# Patient Record
Sex: FEMALE | HISPANIC OR LATINO | URBAN - METROPOLITAN AREA
[De-identification: names, ages, dates, MRNs, and addresses within clinical notes are randomized per-mention and may not be internally consistent; named-entity substitution may affect disease eponyms.]

---

## 2021-12-10 ENCOUNTER — EMERGENCY (EMERGENCY)
Facility: HOSPITAL | Age: 21
LOS: 1 days | Discharge: HOME | End: 2021-12-10
Attending: EMERGENCY MEDICINE | Admitting: EMERGENCY MEDICINE
Payer: MEDICAID

## 2021-12-10 VITALS
DIASTOLIC BLOOD PRESSURE: 74 MMHG | HEART RATE: 124 BPM | RESPIRATION RATE: 22 BRPM | OXYGEN SATURATION: 99 % | SYSTOLIC BLOOD PRESSURE: 135 MMHG

## 2021-12-10 DIAGNOSIS — F10.10 ALCOHOL ABUSE, UNCOMPLICATED: ICD-10-CM

## 2021-12-10 DIAGNOSIS — F43.20 ADJUSTMENT DISORDER, UNSPECIFIED: ICD-10-CM

## 2021-12-10 LAB
ALBUMIN SERPL ELPH-MCNC: 4.5 G/DL — SIGNIFICANT CHANGE UP (ref 3.3–5)
ALP SERPL-CCNC: 175 U/L — HIGH (ref 40–120)
ALT FLD-CCNC: 39 U/L — SIGNIFICANT CHANGE UP (ref 10–45)
AMPHET UR-MCNC: NEGATIVE — SIGNIFICANT CHANGE UP
ANION GAP SERPL CALC-SCNC: 12 MMOL/L — SIGNIFICANT CHANGE UP (ref 5–17)
APAP SERPL-MCNC: <1 UG/ML — LOW (ref 10–30)
AST SERPL-CCNC: 23 U/L — SIGNIFICANT CHANGE UP (ref 10–40)
BARBITURATES UR SCN-MCNC: NEGATIVE — SIGNIFICANT CHANGE UP
BASOPHILS # BLD AUTO: 0.12 K/UL — SIGNIFICANT CHANGE UP (ref 0–0.2)
BASOPHILS NFR BLD AUTO: 1.1 % — SIGNIFICANT CHANGE UP (ref 0–2)
BENZODIAZ UR-MCNC: NEGATIVE — SIGNIFICANT CHANGE UP
BILIRUB SERPL-MCNC: 0.3 MG/DL — SIGNIFICANT CHANGE UP (ref 0.2–1.2)
BUN SERPL-MCNC: 10 MG/DL — SIGNIFICANT CHANGE UP (ref 7–23)
CALCIUM SERPL-MCNC: 9.2 MG/DL — SIGNIFICANT CHANGE UP (ref 8.4–10.5)
CHLORIDE SERPL-SCNC: 104 MMOL/L — SIGNIFICANT CHANGE UP (ref 96–108)
CO2 SERPL-SCNC: 26 MMOL/L — SIGNIFICANT CHANGE UP (ref 22–31)
COCAINE METAB.OTHER UR-MCNC: NEGATIVE — SIGNIFICANT CHANGE UP
CREAT SERPL-MCNC: 0.72 MG/DL — SIGNIFICANT CHANGE UP (ref 0.5–1.3)
EOSINOPHIL # BLD AUTO: 0.18 K/UL — SIGNIFICANT CHANGE UP (ref 0–0.5)
EOSINOPHIL NFR BLD AUTO: 1.7 % — SIGNIFICANT CHANGE UP (ref 0–6)
ETHANOL SERPL-MCNC: 209 MG/DL — HIGH (ref 0–3)
ETHANOL SERPL-MCNC: 23 MG/DL — HIGH (ref 0–3)
GLUCOSE SERPL-MCNC: 129 MG/DL — HIGH (ref 70–99)
HAV IGM SER-ACNC: SIGNIFICANT CHANGE UP
HBV CORE IGM SER-ACNC: SIGNIFICANT CHANGE UP
HBV SURFACE AG SER-ACNC: SIGNIFICANT CHANGE UP
HCG SERPL-ACNC: <1 MIU/ML — SIGNIFICANT CHANGE UP
HCT VFR BLD CALC: 50.1 % — HIGH (ref 34.5–45)
HCV AB S/CO SERPL IA: 0.08 S/CO — SIGNIFICANT CHANGE UP (ref 0–0.99)
HCV AB SERPL-IMP: SIGNIFICANT CHANGE UP
HGB BLD-MCNC: 16.7 G/DL — HIGH (ref 11.5–15.5)
HIV 1 & 2 AB SERPL IA.RAPID: SIGNIFICANT CHANGE UP
IMM GRANULOCYTES NFR BLD AUTO: 1.2 % — SIGNIFICANT CHANGE UP (ref 0–1.5)
LYMPHOCYTES # BLD AUTO: 4.47 K/UL — HIGH (ref 1–3.3)
LYMPHOCYTES # BLD AUTO: 42.8 % — SIGNIFICANT CHANGE UP (ref 13–44)
MCHC RBC-ENTMCNC: 31.5 PG — SIGNIFICANT CHANGE UP (ref 27–34)
MCHC RBC-ENTMCNC: 33.3 GM/DL — SIGNIFICANT CHANGE UP (ref 32–36)
MCV RBC AUTO: 94.5 FL — SIGNIFICANT CHANGE UP (ref 80–100)
METHADONE UR-MCNC: NEGATIVE — SIGNIFICANT CHANGE UP
MONOCYTES # BLD AUTO: 0.95 K/UL — HIGH (ref 0–0.9)
MONOCYTES NFR BLD AUTO: 9.1 % — SIGNIFICANT CHANGE UP (ref 2–14)
NEUTROPHILS # BLD AUTO: 4.6 K/UL — SIGNIFICANT CHANGE UP (ref 1.8–7.4)
NEUTROPHILS NFR BLD AUTO: 44.1 % — SIGNIFICANT CHANGE UP (ref 43–77)
NRBC # BLD: 0 /100 WBCS — SIGNIFICANT CHANGE UP (ref 0–0)
OPIATES UR-MCNC: NEGATIVE — SIGNIFICANT CHANGE UP
PCP SPEC-MCNC: SIGNIFICANT CHANGE UP
PCP UR-MCNC: NEGATIVE — SIGNIFICANT CHANGE UP
PLATELET # BLD AUTO: 463 K/UL — HIGH (ref 150–400)
POTASSIUM SERPL-MCNC: 3.6 MMOL/L — SIGNIFICANT CHANGE UP (ref 3.5–5.3)
POTASSIUM SERPL-SCNC: 3.6 MMOL/L — SIGNIFICANT CHANGE UP (ref 3.5–5.3)
PROT SERPL-MCNC: 8.9 G/DL — HIGH (ref 6–8.3)
RBC # BLD: 5.3 M/UL — HIGH (ref 3.8–5.2)
RBC # FLD: 11.9 % — SIGNIFICANT CHANGE UP (ref 10.3–14.5)
SALICYLATES SERPL-MCNC: 0.9 MG/DL — LOW (ref 3–30)
SARS-COV-2 RNA SPEC QL NAA+PROBE: SIGNIFICANT CHANGE UP
SODIUM SERPL-SCNC: 142 MMOL/L — SIGNIFICANT CHANGE UP (ref 135–145)
T PALLIDUM AB TITR SER: NEGATIVE — SIGNIFICANT CHANGE UP
THC UR QL: NEGATIVE — SIGNIFICANT CHANGE UP
WBC # BLD: 10.45 K/UL — SIGNIFICANT CHANGE UP (ref 3.8–10.5)
WBC # FLD AUTO: 10.45 K/UL — SIGNIFICANT CHANGE UP (ref 3.8–10.5)

## 2021-12-10 PROCEDURE — 12002 RPR S/N/AX/GEN/TRNK2.6-7.5CM: CPT

## 2021-12-10 PROCEDURE — 87635 SARS-COV-2 COVID-19 AMP PRB: CPT

## 2021-12-10 PROCEDURE — 90471 IMMUNIZATION ADMIN: CPT

## 2021-12-10 PROCEDURE — 36415 COLL VENOUS BLD VENIPUNCTURE: CPT

## 2021-12-10 PROCEDURE — 80053 COMPREHEN METABOLIC PANEL: CPT

## 2021-12-10 PROCEDURE — 84702 CHORIONIC GONADOTROPIN TEST: CPT

## 2021-12-10 PROCEDURE — 90792 PSYCH DIAG EVAL W/MED SRVCS: CPT | Mod: 95

## 2021-12-10 PROCEDURE — 86703 HIV-1/HIV-2 1 RESULT ANTBDY: CPT

## 2021-12-10 PROCEDURE — 80307 DRUG TEST PRSMV CHEM ANLYZR: CPT

## 2021-12-10 PROCEDURE — 99285 EMERGENCY DEPT VISIT HI MDM: CPT | Mod: 25

## 2021-12-10 PROCEDURE — 93005 ELECTROCARDIOGRAM TRACING: CPT

## 2021-12-10 PROCEDURE — 93010 ELECTROCARDIOGRAM REPORT: CPT

## 2021-12-10 PROCEDURE — 80074 ACUTE HEPATITIS PANEL: CPT

## 2021-12-10 PROCEDURE — 90715 TDAP VACCINE 7 YRS/> IM: CPT

## 2021-12-10 PROCEDURE — 86780 TREPONEMA PALLIDUM: CPT

## 2021-12-10 PROCEDURE — 85025 COMPLETE CBC W/AUTO DIFF WBC: CPT

## 2021-12-10 RX ORDER — TETANUS TOXOID, REDUCED DIPHTHERIA TOXOID AND ACELLULAR PERTUSSIS VACCINE, ADSORBED 5; 2.5; 8; 8; 2.5 [IU]/.5ML; [IU]/.5ML; UG/.5ML; UG/.5ML; UG/.5ML
0.5 SUSPENSION INTRAMUSCULAR ONCE
Refills: 0 | Status: COMPLETED | OUTPATIENT
Start: 2021-12-10 | End: 2021-12-10

## 2021-12-10 RX ORDER — SODIUM CHLORIDE 9 MG/ML
1000 INJECTION INTRAMUSCULAR; INTRAVENOUS; SUBCUTANEOUS ONCE
Refills: 0 | Status: COMPLETED | OUTPATIENT
Start: 2021-12-10 | End: 2021-12-10

## 2021-12-10 RX ORDER — CEFTRIAXONE 500 MG/1
1000 INJECTION, POWDER, FOR SOLUTION INTRAMUSCULAR; INTRAVENOUS ONCE
Refills: 0 | Status: COMPLETED | OUTPATIENT
Start: 2021-12-10 | End: 2021-12-10

## 2021-12-10 RX ORDER — AZITHROMYCIN 500 MG/1
1000 TABLET, FILM COATED ORAL ONCE
Refills: 0 | Status: COMPLETED | OUTPATIENT
Start: 2021-12-10 | End: 2021-12-10

## 2021-12-10 RX ORDER — LEVONORGESTREL 1.5 MG/1
1.5 TABLET ORAL ONCE
Refills: 0 | Status: COMPLETED | OUTPATIENT
Start: 2021-12-10 | End: 2021-12-10

## 2021-12-10 RX ADMIN — SODIUM CHLORIDE 1000 MILLILITER(S): 9 INJECTION INTRAMUSCULAR; INTRAVENOUS; SUBCUTANEOUS at 05:53

## 2021-12-10 RX ADMIN — TETANUS TOXOID, REDUCED DIPHTHERIA TOXOID AND ACELLULAR PERTUSSIS VACCINE, ADSORBED 0.5 MILLILITER(S): 5; 2.5; 8; 8; 2.5 SUSPENSION INTRAMUSCULAR at 06:01

## 2021-12-10 RX ADMIN — LEVONORGESTREL 1.5 MILLIGRAM(S): 1.5 TABLET ORAL at 06:45

## 2021-12-10 RX ADMIN — AZITHROMYCIN 1000 MILLIGRAM(S): 500 TABLET, FILM COATED ORAL at 06:09

## 2021-12-10 NOTE — ED ADULT NURSE REASSESSMENT NOTE - CONDITION
Christiano Aponte  at bedside to speak with patient about legal options. [id number] [place] [employer], arrival 0700./unchanged

## 2021-12-10 NOTE — ED PROVIDER NOTE - PATIENT PORTAL LINK FT
You can access the FollowMyHealth Patient Portal offered by [facility] by registering at the following website: [email or web address]. By joining [facility]’s FollowMyHealth portal, you will also be able to view your health information using other applications (apps) compatible with our system.

## 2021-12-10 NOTE — ED BEHAVIORAL HEALTH ASSESSMENT NOTE - DESCRIPTION
denies As per  collateral note    COVID screen:  Received second dose of Moderna June 2021  Reports that she has tested positive for antibodies, unclear when  Unknown COVID testing in the last 90 days  Denies COVID positive contacts in the last 10 days  Denies travel out of state in the last 10 day reports that she lives with parents, and parents are in [place]

## 2021-12-10 NOTE — ED BEHAVIORAL HEALTH ASSESSMENT NOTE - NSACTIVEVENT_PSY_ALL_CORE
Triggering events leading to humiliation, shame, and/or despair (e.g., Loss of relationship, financial or health status) (real or anticipated)/Current or pending social isolation/Substance intoxication or withdrawal
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
## 2021-12-10 NOTE — ED PROVIDER NOTE - PHYSICAL EXAMINATION
Gen:  alert, awake, no acute distress  HEENT:  atraumatic head, airway clear, pupils equal and round  CV:  rrr, nl S1, S2, no m/r/g  Pulm:  lungs CTA b/l  Abd: s/nt/nd, +BS  Ext:  moving all extremities  Neuro:  grossly intact, no focal deficits  Skin:  laceration to the distal L wrist approx 3 cm  Psych: AOx3, normal affect, no apparent risk to self or others

## 2021-12-10 NOTE — ED PROVIDER NOTE - NSFOLLOWUPINSTRUCTIONS_ED_ALL_ED_FT
HELP PREVENT SUICIDE - General Information           Help Prevent Suicide    WHAT YOU NEED TO KNOW:    What do I need to know about suicide prevention? A person may see suicide as the only way to escape emotional or physical pain and suffering. You can help provide emotional support for him or her and get the help he or she needs. Learn to recognize warning signs that the person may be considering suicide. Resources are available to help you and the person.    What should I do if I think the person is considering suicide? Call the person's local emergency number (911 in the ) if you feel he or she is at immediate risk of suicide. Also call if he or she talks about an active suicide plan. Assume that the person intends to carry out his or her plan. The following are some things you can do:   •Contact a suicide prevention organization: ?National Suicide Prevention Lifeline: 1-484.351.8597 (1-800-273-TALK)      ?Suicide Hotline: 1-192.322.6193 (3-496-OIKEFVQ)      ?For a list of international numbers: https://save.org/find-help/international-resources/      •Contact the person's therapist. His or her healthcare provider can give you a list of therapists if he or she does not have one.      •Keep medicines, weapons, and alcohol out of the person's reach.      •Do not leave the person alone if he or she says he or she wants to commit suicide. Do not leave the person alone if you think he or she may try it. Make sure you do not put yourself at risk if the person has a weapon.      •Ask the person if he or she is thinking of committing suicide and has a plan.      What warning signs should I watch for?   •Talking about a plan for committing suicide, or suddenly deciding to make a will      •Cutting himself or herself, burning the skin with cigarettes, or driving recklessly      •Drug or alcohol use, not taking prescribed medicine, or taking too much prescribed medicine      •Sudden anger, lashing out at others, or seeming hopeless, anxious, or angry and then suddenly becoming happy or peaceful      •Not wanting to spend time with others or doing things he or she usually enjoys      •Trouble at work, or not showing up for work      •A change in the way he or she eats, sleeps, or dresses      •Weight gain or loss or having less energy than usual      •Trouble sleeping or spending a lot of time sleeping      •Giving away or throwing away his or her belongings      •Suddenly not going to therapy      What increases the risk for suicide?   •Depression or chronic sleep disorders such as insomnia      •Alcohol or drug use      •Death of an important person, or the anniversary of that person's death      •A past suicide attempt, or someone close to him or her attempted or committed suicide      •Mental illness, such as schizophrenia, bipolar disorder, or posttraumatic stress disorder (PTSD)       •Chronic pain, or a serious illness, such as heart disease, cancer, or AIDS      •Being physically dependent on others      •Mental, physical, or sexual abuse      •A history of violence or aggression toward others, or feeling guilty for hurting someone else      •Stress from divorce or a breakup, or loss of a friendship, or loneliness      •Struggling with being jones, lesbian, or bisexual      •A stressful job, or stress from the loss of a job      How will healthcare providers help the person?   •Healthcare providers will ask questions about the person's suicide thoughts and plans. They will ask how often he or she thinks about suicide and if he or she has tried it before. They will ask if he or she hurt himself or herself, such as with cutting or reckless driving. They may ask if he or she has access to weapons or drugs.       •A healthcare provider will help the person create a safety plan. The plan includes a list of people or groups to contact if he or she has suicidal thoughts again. The list may include friends, family members, a spiritual leader, and others he or she trusts. The person may be asked to make a verbal agreement or sign a contract that he or she will not try to harm himself.      What treatment may the person need?   •Medicines may be given to prevent mood swings, or to decrease anxiety or depression. The person will need to take all medicines as directed. A sudden stop can be harmful. It may take 4 to 6 weeks for the medicine to help him or her feel better.      •A therapist can help the person identify and change negative feelings or beliefs about himself or herself. This may also help change the way the he or she feels and acts. A therapist can also help the person find ways to cope with things that cannot be changed.       What can I do to help the person?   •Encourage the person to seek help for drug or alcohol abuse. Drugs and alcohol can increase suicidal thoughts and make the person more likely to act on them.      •Help the person connect with others. Encourage him or her to become involved in the community. Some examples include tutoring a young student, volunteering at a local organization, or joining a group exercise program.      •Exercise with the person. Exercise can lift his or her mood, increase energy, and make it easier to sleep at night.      •Encourage the person to try new things. Adults who are open to new experiences handle stress and change better than those who are not.      •Call, visit, or send postcards to the person often. Check on him or her after the loss of a pet, longtime friend, or child. Holidays, birthdays, and anniversaries can be difficult for a person after a loss. The loss of a spouse can be especially painful and lonely.      •Help the person schedule a visit with his or her Mandaen or spiritual leader. A Mandaen or spiritual leader may be able to offer additional support and resources to the person.      •Encourage the person to continue taking medicine and going to therapy. Medicine and therapy can help improve his or her mental health.      Where can I find support and more information?   •National Suicide Prevention Lifeline  [facility],[place] [address]  Phone: 0-147-784-VNQZ (2773)  Web Address: http://www.suicidepreventionlifeline.org      •Suicide Awareness Voices of Education  [address]  [place], [place]  Phone: 1-302.479.9086  Web Address: http://www.save.org        Call the person's local emergency number (911 in the [place]) if:   •The person has done something on purpose to hurt himself or herself.      •The person tries to commit suicide.      •The person tells you he or she made a plan to commit suicide.      When should I call the person's doctor or therapist?   •The person acts out in anger, is reckless, or is abusing alcohol or drugs.      •The person has serious thoughts of suicide, even after treatment.      •You begin to see warning signs that the person may be considering suicide.      •The person has intense feelings of sadness, anger, revenge, or despair.      •The person withdraws from others.      •The person tells you he or she has more thoughts of suicide when alone.      •The person stops eating, or begins to smoke or drink heavily.      •The person says he or she is a burden because of a disability or disease.      •You have questions or concerns about the person's condition or care.      CARE AGREEMENT:    You have the right to help plan your care. Learn about your health condition and how it may be treated. Discuss treatment options with your healthcare providers to decide what care you want to receive. You always have the right to refuse treatment.        © Copyright [employer] 2021           back to top                          © Copyright [employer] 2021

## 2021-12-10 NOTE — ED ADULT NURSE REASSESSMENT NOTE - REASSESS COMMUNICATION
Patient states that she is willing to provide collateral. Friend Arnol [phone] or Mother [phone]/ED physician notified
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
## 2021-12-10 NOTE — ED BEHAVIORAL HEALTH ASSESSMENT NOTE - RISK ASSESSMENT
RF: substance use, school stress, not engaged in any psych care outpatient  PF: currently employed and engaged in school, no psych history, reports family as a protective factor, future oriented Low Acute Suicide Risk
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
## 2021-12-10 NOTE — ED BEHAVIORAL HEALTH NOTE - BEHAVIORAL HEALTH NOTE
========================  FOR EACH COLLATERAL  ========================  Patient gives permission to obtain collateral from _____:   Gal WILEY informed BTCM that pt provided verbal consent. Furthermore, pt gave the RN the contact information for telepsych to call.     (  X) Yes    (  )  No    Rationale for overriding objection              (  ) Lack of capacity. Details: ________              (  ) Assessing risk of danger to self/others. Details: ________    Rationale for selecting specific collateral source              (  ) Potential to impact risk of danger to self/others and no alternative equivalent. Details: _____      NAME: Pt's aunt, the name was not provided. Unable to endorse from collateral.     NUMBER: [phone].     RELATIONSHIP: Aunt. However, pt considers this person her mother.      RELIABILITY: Reliable.     COMMENTS: [facility] contacted pt's collateral ID# [id number] using  Services via [employer]. BT called 3x but collateral refused to answer questions for BT and instead asked questions related to why was patient in the hospital and what are the details that brought pt to the ED. [facility] provided limited details in reference to pt bringing herself to the hospital and telepsych was consulted for a psych eval. BTCM reassured collateral that pt is safe and medically stable. However, this collateral is essential toward helping facilnate pt's treatment plan. Collateral refused to participate and proceeded with hanging up the phone again. Prior to collateral hanging up [facility] provided information only stating which hospital telepsych was calling from and that the patient provided consent for telepsych to call collateral.      ========================  FOR EACH COLLATERAL  ========================  Patient gives permission to obtain collateral from _____:  Gal WILEY informed BTCM that pt provided verbal consent. Furthermore, pt gave the RN the contact information for telepsych to call.     (  X) Yes    (  )  No    Rationale for overriding objection              (  ) Lack of capacity. Details: ________              (  ) Assessing risk of danger to self/others. Details: ________    Rationale for selecting specific collateral source              (  ) Potential to impact risk of danger to self/others and no alternative equivalent. Details: _____    NAME: Arnol.     NUMBER: [phone].     RELATIONSHIP: Friend/Previous Boss/Family friend.      RELIABILITY: Not reliable.     COMMENTS: Collateral sounded shocked on the phone why pt was in the hospital. [facility] informed collateral vaguely that pt brought herself to the ED and due to concerning statements telepsych was called for a psych consult. [facility] informed collateral that pt is safe and medically stable. However, it's important that collateral answers clinical questions in order to assist pt. 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 However, the business was closed due to covid. Collateral denied knowing information related to pphx, outpatient mental health treatment, inpatient psych admission or any hx of SI.SIB.SA. Collateral insisted on calling pt to inquire how she's doing and if pt needs any assistance.     ------------------------------------------------  COVID Exposure Screen- collateral (i.e. third-party, chart review, belongings, etc; include EMS and ED staff)  ---------------------------------------------------    1.    Has the patient had a COVID-19 test in the last 90 days? Unknown.    2. Has the patient tested positive for COVID-19 antibodies? Unknown.    3.Has the patient received 2 doses of the COVID-19 vaccine?  Unknown.    4. In the past 10 days, has the patient been around anyone with a positive COVID-19 test?* Unknown.    5.Has the patient been out of [place] within the past 10 days? Unknown.

## 2021-12-10 NOTE — ED BEHAVIORAL HEALTH ASSESSMENT NOTE - NSSUICPROTFACT_PSY_ALL_CORE
Responsibility to children, family, or others/Identifies reasons for living/Fear of death or the actual act of killing self/Engaged in work or school
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
## 2021-12-10 NOTE — ED PROVIDER NOTE - NSFOLLOWUPCLINICS_GEN_ALL_ED_FT
[facility]  Psychiatry  [address] [place] [address]  Phone: [phone]  Fax:

## 2021-12-11 VITALS
DIASTOLIC BLOOD PRESSURE: 82 MMHG | OXYGEN SATURATION: 98 % | TEMPERATURE: 98 F | HEART RATE: 82 BPM | RESPIRATION RATE: 16 BRPM | SYSTOLIC BLOOD PRESSURE: 129 MMHG

## 2021-12-11 PROCEDURE — 99215 OFFICE O/P EST HI 40 MIN: CPT | Mod: 95
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
## 2021-12-11 NOTE — ED BEHAVIORAL HEALTH NOTE - BEHAVIORAL HEALTH NOTE
========================  FOR EACH COLLATERAL  ========================  Patient gives permission to obtain collateral from _____:    ( X  ) Yes    (  )  No    Rationale for overriding objection              (  ) Lack of capacity. Details: ________              (  ) Assessing risk of danger to self/others. Details: ________    Rationale for selecting specific collateral source              (  ) Potential to impact risk of danger to self/others and no alternative equivalent. Details: ____     NAME: Mildred.    NUMBER:  [phone].     RELATIONSHIP: Aunt/mother.     RELIABILITY: Reliable.     COMMENTS: CM used [employer] to communicate with collateral in English. ID#[id number]    ========================   PATIENT DEMOGRAPHICS:  ========================    HPI    BASELINE FUNCTIONING: Patient is a 21-year-old female, lives with aunt and two sisters (13 and 8),  employed at a clothing store, no known pphx including medication, outpatient mental health treatment or inpatient psych admission, no known pmhx, no known allergies, no substance use, no hx of SI.SIB.SA as per collateral.     DATE HPI STARTED: Yesterday 12/10/21.    DECOMPENSATION: Pt informed collateral that she likes tattoos and while playing with a needle yesterday pt started to bleed which then pt decided to bring herself to the hospital. Collateral reports that pt has been at baseline, no concerns related to pt's sleep, appetite, energy or mood. Denied anhedonia. Pt usually goes to work, comes home and spends time with family which pt has been doing this week as well.     SUICIDALITY: No recent SI.SIB.SA.     VIOLENCE: No recent violence.     SUBSTANCE: No recent substance use.     ========================  PAST PSYCHIATRIC HISTORY  ========================    DATE PAST PSYCHIATRIC HISTORY STARTED: None.     MAIN PSYCHIATRIC DIAGNOSIS: No hx of previous diagnosis.     PSYCHIATRIC HOSPITALIZATIONS: No hx of inpatient psych admission.     PRIOR ILLNESS: No hx of outpatient mental health treatment.     SUICIDALITY: No hx of SI.SIB.SA.    VIOLENCE: No hx of violence.     SUBSTANCE USE:  No recent alcohol use, illicit drugs or cigarettes.     ==============  OTHER HISTORY  ==============    CURRENT MEDICATION: None.     MEDICAL HISTORY: No pmhx.     ALLERGIES: No known allergies.     LEGAL ISSUES: Declined.     FIREARM ACCESS: No access to firearms, weapons or guns.     SOCIAL HISTORY: No hx of trauma.     FAMILY HISTORY: None.     DEVELOPMENTAL HISTORY: Declined.     ------------------------------------------------  COVID Exposure Screen- collateral (i.e. third-party, chart review, belongings, etc; include EMS and ED staff)  ---------------------------------------------------    1.    Has the patient had a COVID-19 test in the last 90 days?  Yes.     2. Has the patient tested positive for COVID-19 antibodies? Unkown.    3.Has the patient received 2 doses of the COVID-19 vaccine?   Yes. Does not remember which vaccine.      4. In the past 10 days, has the patient been around anyone with a positive COVID-19 test?* No.     5.Has the patient been out of [place] within the past 10 days? No.

## 2021-12-11 NOTE — PROGRESS NOTE BEHAVIORAL HEALTH - RISK ASSESSMENT
etOh abuse with self harm x1, otherwise no prior SA or psych history, has social supports, high functioning. Protective factors > risks.

## 2021-12-11 NOTE — PROGRESS NOTE BEHAVIORAL HEALTH - NS ED BHA AXIS I SECONDARY1 CODE FT
.recheck the chol and liver enzzymes  
Hypertension is unchanged.  Continue current treatment regimen.  Blood pressure will be reassessed 3-6 months.  
F10.10

## 2021-12-11 NOTE — PROGRESS NOTE BEHAVIORAL HEALTH - NSBHFUPINTERVALHXFT_PSY_A_CORE
uneventful, patient calm cooperative, see SW for more informative collateral reporting than prior attempts.   Patient admits to impulsive act when in distress.  Remorseful of experience.

## 2021-12-11 NOTE — PROGRESS NOTE BEHAVIORAL HEALTH - SUMMARY
"22yo F, domiciled with family (parents are currently in [place]), employed in retail, currently in school at [employer] doing criminal justice studies, denies past psychiatric history (including hospitalization, psychiatrist, psych medications, self injury, SAs, violence, substances) who presented to the ER intoxicated for help after cutting arm with razor. "    All clinical data and history points to pt not having risk factors, mostly an impulsive act of self harm while under stress.  patient would not benefit from inpatient admission.  Able to safety plan and appropriate for discharge as pt is insightful and not acutely suicidal.

## 2024-04-11 ENCOUNTER — EMERGENCY (EMERGENCY)
Facility: HOSPITAL | Age: 24
LOS: 1 days | Discharge: HOME | End: 2024-04-11
Attending: EMERGENCY MEDICINE | Admitting: EMERGENCY MEDICINE
Payer: MEDICAID

## 2024-04-11 VITALS
OXYGEN SATURATION: 97 % | HEART RATE: 117 BPM | HEIGHT: 64 IN | SYSTOLIC BLOOD PRESSURE: 120 MMHG | WEIGHT: 139.99 LBS | TEMPERATURE: 98 F | DIASTOLIC BLOOD PRESSURE: 75 MMHG | RESPIRATION RATE: 18 BRPM

## 2024-04-11 VITALS
DIASTOLIC BLOOD PRESSURE: 78 MMHG | OXYGEN SATURATION: 97 % | RESPIRATION RATE: 18 BRPM | SYSTOLIC BLOOD PRESSURE: 118 MMHG | HEART RATE: 95 BPM

## 2024-04-11 LAB
ALBUMIN SERPL ELPH-MCNC: 4.3 G/DL — SIGNIFICANT CHANGE UP (ref 3.3–5)
ALP SERPL-CCNC: 174 U/L — HIGH (ref 40–120)
ALT FLD-CCNC: 32 U/L — SIGNIFICANT CHANGE UP (ref 10–45)
ANION GAP SERPL CALC-SCNC: 11 MMOL/L — SIGNIFICANT CHANGE UP (ref 5–17)
APAP SERPL-MCNC: <1 UG/ML — LOW (ref 10–30)
APPEARANCE UR: CLEAR — SIGNIFICANT CHANGE UP
AST SERPL-CCNC: 19 U/L — SIGNIFICANT CHANGE UP (ref 10–40)
BASOPHILS # BLD AUTO: 0.08 K/UL — SIGNIFICANT CHANGE UP (ref 0–0.2)
BASOPHILS NFR BLD AUTO: 1.2 % — SIGNIFICANT CHANGE UP (ref 0–2)
BILIRUB SERPL-MCNC: 0.4 MG/DL — SIGNIFICANT CHANGE UP (ref 0.2–1.2)
BILIRUB UR-MCNC: NEGATIVE — SIGNIFICANT CHANGE UP
BUN SERPL-MCNC: 9 MG/DL — SIGNIFICANT CHANGE UP (ref 7–23)
CALCIUM SERPL-MCNC: 8.9 MG/DL — SIGNIFICANT CHANGE UP (ref 8.4–10.5)
CHLORIDE SERPL-SCNC: 108 MMOL/L — SIGNIFICANT CHANGE UP (ref 96–108)
CO2 SERPL-SCNC: 26 MMOL/L — SIGNIFICANT CHANGE UP (ref 22–31)
COLOR SPEC: YELLOW — SIGNIFICANT CHANGE UP
CREAT SERPL-MCNC: 0.67 MG/DL — SIGNIFICANT CHANGE UP (ref 0.5–1.3)
DIFF PNL FLD: NEGATIVE — SIGNIFICANT CHANGE UP
EGFR: 125 ML/MIN/1.73M2 — SIGNIFICANT CHANGE UP
EOSINOPHIL # BLD AUTO: 0.06 K/UL — SIGNIFICANT CHANGE UP (ref 0–0.5)
EOSINOPHIL NFR BLD AUTO: 0.9 % — SIGNIFICANT CHANGE UP (ref 0–6)
ETHANOL SERPL-MCNC: 292 MG/DL — HIGH (ref 0–3)
GLUCOSE BLDC GLUCOMTR-MCNC: 71 MG/DL — SIGNIFICANT CHANGE UP (ref 70–99)
GLUCOSE SERPL-MCNC: 102 MG/DL — HIGH (ref 70–99)
GLUCOSE UR QL: NEGATIVE MG/DL — SIGNIFICANT CHANGE UP
HCG SERPL-ACNC: <1 MIU/ML — SIGNIFICANT CHANGE UP
HCT VFR BLD CALC: 42.6 % — SIGNIFICANT CHANGE UP (ref 34.5–45)
HGB BLD-MCNC: 14.7 G/DL — SIGNIFICANT CHANGE UP (ref 11.5–15.5)
IMM GRANULOCYTES NFR BLD AUTO: 0.7 % — SIGNIFICANT CHANGE UP (ref 0–0.9)
KETONES UR-MCNC: ABNORMAL MG/DL
LEUKOCYTE ESTERASE UR-ACNC: NEGATIVE — SIGNIFICANT CHANGE UP
LYMPHOCYTES # BLD AUTO: 2.78 K/UL — SIGNIFICANT CHANGE UP (ref 1–3.3)
LYMPHOCYTES # BLD AUTO: 40.8 % — SIGNIFICANT CHANGE UP (ref 13–44)
MCHC RBC-ENTMCNC: 31.6 PG — SIGNIFICANT CHANGE UP (ref 27–34)
MCHC RBC-ENTMCNC: 34.5 GM/DL — SIGNIFICANT CHANGE UP (ref 32–36)
MCV RBC AUTO: 91.6 FL — SIGNIFICANT CHANGE UP (ref 80–100)
MONOCYTES # BLD AUTO: 0.65 K/UL — SIGNIFICANT CHANGE UP (ref 0–0.9)
MONOCYTES NFR BLD AUTO: 9.5 % — SIGNIFICANT CHANGE UP (ref 2–14)
NEUTROPHILS # BLD AUTO: 3.19 K/UL — SIGNIFICANT CHANGE UP (ref 1.8–7.4)
NEUTROPHILS NFR BLD AUTO: 46.9 % — SIGNIFICANT CHANGE UP (ref 43–77)
NITRITE UR-MCNC: NEGATIVE — SIGNIFICANT CHANGE UP
NRBC # BLD: 0 /100 WBCS — SIGNIFICANT CHANGE UP (ref 0–0)
PH UR: 5 — SIGNIFICANT CHANGE UP (ref 5–8)
PLATELET # BLD AUTO: 367 K/UL — SIGNIFICANT CHANGE UP (ref 150–400)
POTASSIUM SERPL-MCNC: 4.1 MMOL/L — SIGNIFICANT CHANGE UP (ref 3.5–5.3)
POTASSIUM SERPL-SCNC: 4.1 MMOL/L — SIGNIFICANT CHANGE UP (ref 3.5–5.3)
PROT SERPL-MCNC: 8.4 G/DL — HIGH (ref 6–8.3)
PROT UR-MCNC: NEGATIVE MG/DL — SIGNIFICANT CHANGE UP
RBC # BLD: 4.65 M/UL — SIGNIFICANT CHANGE UP (ref 3.8–5.2)
RBC # FLD: 12.1 % — SIGNIFICANT CHANGE UP (ref 10.3–14.5)
SALICYLATES SERPL-MCNC: <0.2 MG/DL — LOW (ref 3–30)
SODIUM SERPL-SCNC: 145 MMOL/L — SIGNIFICANT CHANGE UP (ref 135–145)
SP GR SPEC: 1.01 — SIGNIFICANT CHANGE UP (ref 1–1.03)
UROBILINOGEN FLD QL: 0.2 MG/DL — SIGNIFICANT CHANGE UP (ref 0.2–1)
WBC # BLD: 6.81 K/UL — SIGNIFICANT CHANGE UP (ref 3.8–10.5)
WBC # FLD AUTO: 6.81 K/UL — SIGNIFICANT CHANGE UP (ref 3.8–10.5)

## 2024-04-11 PROCEDURE — 80307 DRUG TEST PRSMV CHEM ANLYZR: CPT

## 2024-04-11 PROCEDURE — 85025 COMPLETE CBC W/AUTO DIFF WBC: CPT

## 2024-04-11 PROCEDURE — 96375 TX/PRO/DX INJ NEW DRUG ADDON: CPT

## 2024-04-11 PROCEDURE — 80053 COMPREHEN METABOLIC PANEL: CPT

## 2024-04-11 PROCEDURE — 36415 COLL VENOUS BLD VENIPUNCTURE: CPT

## 2024-04-11 PROCEDURE — 99285 EMERGENCY DEPT VISIT HI MDM: CPT

## 2024-04-11 PROCEDURE — 82962 GLUCOSE BLOOD TEST: CPT

## 2024-04-11 PROCEDURE — 96374 THER/PROPH/DIAG INJ IV PUSH: CPT

## 2024-04-11 PROCEDURE — 84702 CHORIONIC GONADOTROPIN TEST: CPT

## 2024-04-11 PROCEDURE — 99285 EMERGENCY DEPT VISIT HI MDM: CPT | Mod: 25

## 2024-04-11 RX ORDER — HALOPERIDOL DECANOATE 100 MG/ML
5 INJECTION INTRAMUSCULAR ONCE
Refills: 0 | Status: COMPLETED | OUTPATIENT
Start: 2024-04-11 | End: 2024-04-11

## 2024-04-11 RX ADMIN — Medication 2 MILLIGRAM(S): at 01:14

## 2024-04-11 RX ADMIN — HALOPERIDOL DECANOATE 5 MILLIGRAM(S): 100 INJECTION INTRAMUSCULAR at 02:43
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
## 2024-04-11 NOTE — ED ADULT NURSE NOTE - NSFALLUNIVINTERV_ED_ALL_ED
Bed/Stretcher in lowest position, wheels locked, appropriate side rails in place/Call bell, personal items and telephone in reach/Instruct patient to call for assistance before getting out of bed/chair/stretcher/Non-slip footwear applied when patient is off stretcher/[place] to call system/Physically safe environment - no spills, clutter or unnecessary equipment/Purposeful proactive rounding/Room/bathroom lighting operational, light cord in reach

## 2024-04-11 NOTE — ED ADULT NURSE NOTE - CCCP TRG CHIEF CMPLNT
DISCHARGE SUMMARY:   Patient seen and examined. Probable diagnosis, differential diagnosis, treatment, treatment options, and probable complications were discussed and explained to patient. she was to take medication/s associated with this visit. she may take over-the-counter pain and/or fever medication if needed. Advised increased oral fluid intake (2 liters of water or more per day) if with no nausea nor vomiting. Reinforced to continue personal hygiene. Patient to return to clinic if there is worsening or persistence of symptoms. Patient verbalized understanding.    Patient to come back in 7 - 10 days if needed for worsening symptoms.    
see chief complaint quote

## 2024-04-11 NOTE — ED PROVIDER NOTE - NSFOLLOWUPINSTRUCTIONS_ED_ALL_ED_FT
Alcohol intoxication    Alcohol intoxication occurs when the amount of alcohol that a person has consumed impairs his or her ability to mentally and physically function. Chronic alcohol consumption can also lead to a variety of health issues including neurological disease, stomach disease, heart disease, liver disease, etc. Do not drive after drinking alcohol. Drinking enough alcohol to end up in an Emergency Room suggests you may have an alcohol abuse problem. Seek help at a drug addiction center.    SEEK IMMEDIATE MEDICAL CARE IF YOU HAVE ANY OF THE FOLLOWING SYMPTOMS: seizures, vomiting blood, blood in your stool, lightheadedness/dizziness, or becoming shaky to tremulous when you stop drinking.

## 2024-04-11 NOTE — ED PROVIDER NOTE - PATIENT PORTAL LINK FT
You can access the FollowMyHealth Patient Portal offered by [facility] by registering at the following website: [email or web address]. By joining [employer]’s FollowMyHealth portal, you will also be able to view your health information using other applications (apps) compatible with our system.

## 2024-04-11 NOTE — ED PROVIDER NOTE - CLINICAL SUMMARY MEDICAL DECISION MAKING FREE TEXT BOX
pt with h/o alcohol abuse , [place]OLANDA Alonso [place] police , [place],
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
